# Patient Record
Sex: FEMALE | URBAN - METROPOLITAN AREA
[De-identification: names, ages, dates, MRNs, and addresses within clinical notes are randomized per-mention and may not be internally consistent; named-entity substitution may affect disease eponyms.]

---

## 2017-01-03 ENCOUNTER — APPOINTMENT (OUTPATIENT)
Age: 62
Setting detail: DERMATOLOGY
End: 2017-01-03

## 2017-01-03 VITALS — HEIGHT: 66 IN | WEIGHT: 270 LBS

## 2017-01-03 DIAGNOSIS — L71.8 OTHER ROSACEA: ICD-10-CM

## 2017-01-03 DIAGNOSIS — L82.1 OTHER SEBORRHEIC KERATOSIS: ICD-10-CM

## 2017-01-03 DIAGNOSIS — L21.8 OTHER SEBORRHEIC DERMATITIS: ICD-10-CM

## 2017-01-03 PROBLEM — D23.72 OTHER BENIGN NEOPLASM OF SKIN OF LEFT LOWER LIMB, INCLUDING HIP: Status: ACTIVE | Noted: 2017-01-03

## 2017-01-03 PROBLEM — I10 ESSENTIAL (PRIMARY) HYPERTENSION: Status: ACTIVE | Noted: 2017-01-03

## 2017-01-03 PROBLEM — M12.9 ARTHROPATHY, UNSPECIFIED: Status: ACTIVE | Noted: 2017-01-03

## 2017-01-03 PROCEDURE — OTHER FOLLOW UP FOR NEXT VISIT: OTHER

## 2017-01-03 PROCEDURE — OTHER REASSURANCE: OTHER

## 2017-01-03 PROCEDURE — 99203 OFFICE O/P NEW LOW 30 MIN: CPT

## 2017-01-03 PROCEDURE — OTHER PRESCRIPTION: OTHER

## 2017-01-03 PROCEDURE — OTHER TREATMENT REGIMEN: OTHER

## 2017-01-03 RX ORDER — METRONIDAZOLE 7.5 MG/G
THIN COAT CREAM TOPICAL BID
Qty: 1 | Refills: 2 | Status: ERX | COMMUNITY
Start: 2017-01-03

## 2017-01-03 ASSESSMENT — SEVERITY ASSESSMENT OVERALL AMONG ALL PATIENTS
IN YOUR EXPERIENCE, AMONG ALL PATIENTS YOU HAVE SEEN WITH THIS CONDITION, HOW SEVERE IS THIS PATIENT'S CONDITION?: MILD TO MODERATE

## 2017-01-03 ASSESSMENT — LOCATION SIMPLE DESCRIPTION DERM
LOCATION SIMPLE: LEFT CALF
LOCATION SIMPLE: INFERIOR FOREHEAD
LOCATION SIMPLE: RIGHT CHEEK
LOCATION SIMPLE: LEFT CHEEK
LOCATION SIMPLE: RIGHT FOREARM
LOCATION SIMPLE: RIGHT CALF

## 2017-01-03 ASSESSMENT — LOCATION ZONE DERM
LOCATION ZONE: ARM
LOCATION ZONE: FACE
LOCATION ZONE: LEG

## 2017-01-03 ASSESSMENT — LOCATION DETAILED DESCRIPTION DERM
LOCATION DETAILED: RIGHT DISTAL DORSAL FOREARM
LOCATION DETAILED: INFERIOR MID FOREHEAD
LOCATION DETAILED: LEFT INFERIOR CENTRAL MALAR CHEEK
LOCATION DETAILED: LEFT INFERIOR MEDIAL MALAR CHEEK
LOCATION DETAILED: RIGHT INFERIOR MEDIAL MALAR CHEEK
LOCATION DETAILED: LEFT DISTAL CALF
LOCATION DETAILED: RIGHT PROXIMAL CALF

## 2017-01-03 ASSESSMENT — SEVERITY ASSESSMENT: HOW SEVERE IS THIS PATIENT'S CONDITION?: MILD

## 2017-01-03 NOTE — PROCEDURE: REASSURANCE
Additional Note: I should reassure these are benign, no treatment needed. Regarding the lesion on the right arm if any irritation the summer she can follow up for a re-evaluation to see if anything more needs to be done but I see no evidence of any skin cancer or other suspicious lesion at this time
Detail Level: Detailed
Include Location In Plan?: Yes
Additional Notes (Optional): Reassured that this is a benign lesion, no treatment needed as there is no clinical suspicion for malignancy. She will follow up with any changes in color, any pain, or bleeding

## 2017-01-03 NOTE — PROCEDURE: TREATMENT REGIMEN
Plan: Long discussion with patient regarding the nature of this disease, will try metronidazole first that can change medication if needed
Detail Level: Simple
Plan: Topical metronidazole should help as well however, we can try different treatment options if needed. Of note patient is allergic to sulfa products
Otc Regimen: Dove soap, Aveeno or Crisco as lotion
Otc Regimen: Cerave or dove
Detail Level: Zone

## 2018-08-09 ENCOUNTER — APPOINTMENT (OUTPATIENT)
Age: 63
Setting detail: DERMATOLOGY
End: 2018-08-16

## 2018-08-09 VITALS — HEIGHT: 66 IN

## 2018-08-09 DIAGNOSIS — L82.1 OTHER SEBORRHEIC KERATOSIS: ICD-10-CM

## 2018-08-09 DIAGNOSIS — L30.9 DERMATITIS, UNSPECIFIED: ICD-10-CM

## 2018-08-09 DIAGNOSIS — L0391 CELLULITIS AND ABSCESS OF UNSPECIFIED SITES: ICD-10-CM

## 2018-08-09 DIAGNOSIS — L0390 CELLULITIS AND ABSCESS OF UNSPECIFIED SITES: ICD-10-CM

## 2018-08-09 PROBLEM — L08.9 LOCAL INFECTION OF THE SKIN AND SUBCUTANEOUS TISSUE, UNSPECIFIED: Status: ACTIVE | Noted: 2018-08-09

## 2018-08-09 PROCEDURE — OTHER PRESCRIPTION: OTHER

## 2018-08-09 PROCEDURE — OTHER COUNSELING: OTHER

## 2018-08-09 PROCEDURE — OTHER TREATMENT REGIMEN: OTHER

## 2018-08-09 PROCEDURE — 99214 OFFICE O/P EST MOD 30 MIN: CPT

## 2018-08-09 PROCEDURE — OTHER MIPS QUALITY: OTHER

## 2018-08-09 PROCEDURE — OTHER FOLLOW UP FOR NEXT VISIT: OTHER

## 2018-08-09 PROCEDURE — OTHER REASSURANCE: OTHER

## 2018-08-09 RX ORDER — TRIAMCINOLONE ACETONIDE 1 MG/G
THIN COAT CREAM TOPICAL BID
Qty: 1 | Refills: 1 | Status: ERX | COMMUNITY
Start: 2018-08-09

## 2018-08-09 RX ORDER — CEPHALEXIN 500 MG/1
500 MG TABLET ORAL BID
Qty: 60 | Refills: 0 | Status: ERX | COMMUNITY
Start: 2018-08-09

## 2018-08-09 ASSESSMENT — LOCATION DETAILED DESCRIPTION DERM
LOCATION DETAILED: LEFT MEDIAL MALAR CHEEK
LOCATION DETAILED: LEFT PROXIMAL DORSAL FOREARM
LOCATION DETAILED: RIGHT PROXIMAL PRETIBIAL REGION
LOCATION DETAILED: LEFT PROXIMAL PRETIBIAL REGION
LOCATION DETAILED: LEFT PROXIMAL RADIAL DORSAL FOREARM

## 2018-08-09 ASSESSMENT — LOCATION ZONE DERM
LOCATION ZONE: FACE
LOCATION ZONE: ARM
LOCATION ZONE: LEG

## 2018-08-09 ASSESSMENT — LOCATION SIMPLE DESCRIPTION DERM
LOCATION SIMPLE: LEFT PRETIBIAL REGION
LOCATION SIMPLE: LEFT CHEEK
LOCATION SIMPLE: LEFT FOREARM
LOCATION SIMPLE: RIGHT PRETIBIAL REGION

## 2018-08-09 ASSESSMENT — PAIN INTENSITY VAS
HOW INTENSE IS YOUR PAIN 0 BEING NO PAIN, 10 BEING THE MOST SEVERE PAIN POSSIBLE?: 1/10 PAIN
HOW INTENSE IS YOUR PAIN 0 BEING NO PAIN, 10 BEING THE MOST SEVERE PAIN POSSIBLE?: NO PAIN

## 2018-08-09 ASSESSMENT — SEVERITY ASSESSMENT
SEVERITY: MILD
SEVERITY: MILD TO MODERATE

## 2018-08-09 ASSESSMENT — BSA RASH: BSA RASH: 5

## 2018-08-09 NOTE — HPI: BUMPS
How Severe Are Your Bumps?: moderate
Have Your Bumps Been Treated?: not been treated
Is This A New Presentation, Or A Follow-Up?: Bump
Additional History: Pt has taken cephalexin and omeprazole, did not help.  The patient developed swelling and tenderness of the left arm several weeks ago. She has seen her primary care physician who did an ultrasound which showed no evidence of any tumor and was being treated for what sounds like a cellulitis. She was put on an antibiotic for seven days that did seem to help but when she came off the antibiotic within several days the arm started swelling and becoming painful again. There are no open sores, she has no fever or chills, no other skin surfaces are affected

## 2018-08-09 NOTE — PROCEDURE: TREATMENT REGIMEN
Detail Level: Simple
Plan: Patient is complaining of some slight itching on the left arm where she has what I believe to be a low level infection. I am going to send in a topical steroid to be used as needed. She can also use this on her lower legs from time to time if she has any itching
Plan: This does appear consistent with cellulitis or a sub cutaneous low level infection. There is induration and heat on palpation but no overt redness or fluctuance. I am going to put her back on the oral antibiotics for 14 to 21 days. Patient will need to call in one week to check in regarding progress. If no improvement or any worsening she may need further evaluation

## 2018-08-09 NOTE — PROCEDURE: REASSURANCE
Detail Level: Zone
Additional Note: Patient reassured these are benign, no treatment needed. Follow up with these increase in size or become painful
Include Location In Plan?: Yes

## 2018-08-21 ENCOUNTER — APPOINTMENT (OUTPATIENT)
Age: 63
Setting detail: DERMATOLOGY
End: 2018-08-21

## 2018-08-21 VITALS — HEIGHT: 69 IN

## 2018-08-21 DIAGNOSIS — L21.8 OTHER SEBORRHEIC DERMATITIS: ICD-10-CM

## 2018-08-21 DIAGNOSIS — D485 NEOPLASM OF UNCERTAIN BEHAVIOR OF SKIN: ICD-10-CM

## 2018-08-21 DIAGNOSIS — L0390 CELLULITIS AND ABSCESS OF UNSPECIFIED SITES: ICD-10-CM

## 2018-08-21 DIAGNOSIS — L71.8 OTHER ROSACEA: ICD-10-CM

## 2018-08-21 DIAGNOSIS — L0391 CELLULITIS AND ABSCESS OF UNSPECIFIED SITES: ICD-10-CM

## 2018-08-21 PROBLEM — J45.909 UNSPECIFIED ASTHMA, UNCOMPLICATED: Status: ACTIVE | Noted: 2018-08-21

## 2018-08-21 PROBLEM — D48.5 NEOPLASM OF UNCERTAIN BEHAVIOR OF SKIN: Status: ACTIVE | Noted: 2018-08-21

## 2018-08-21 PROBLEM — L08.9 LOCAL INFECTION OF THE SKIN AND SUBCUTANEOUS TISSUE, UNSPECIFIED: Status: ACTIVE | Noted: 2018-08-21

## 2018-08-21 PROBLEM — L85.3 XEROSIS CUTIS: Status: ACTIVE | Noted: 2018-08-21

## 2018-08-21 PROCEDURE — OTHER COUNSELING: OTHER

## 2018-08-21 PROCEDURE — 99214 OFFICE O/P EST MOD 30 MIN: CPT | Mod: 25

## 2018-08-21 PROCEDURE — 11302 SHAVE SKIN LESION 1.1-2.0 CM: CPT

## 2018-08-21 PROCEDURE — OTHER PRESCRIPTION: OTHER

## 2018-08-21 PROCEDURE — OTHER SHAVE REMOVAL: OTHER

## 2018-08-21 PROCEDURE — OTHER FOLLOW UP FOR NEXT VISIT: OTHER

## 2018-08-21 PROCEDURE — OTHER TREATMENT REGIMEN: OTHER

## 2018-08-21 RX ORDER — CEPHALEXIN 500 MG/1
500 MG TABLET ORAL BID
Qty: 28 | Refills: 0 | Status: ERX

## 2018-08-21 ASSESSMENT — LOCATION SIMPLE DESCRIPTION DERM
LOCATION SIMPLE: LEFT CHEEK
LOCATION SIMPLE: LEFT FOREARM
LOCATION SIMPLE: INFERIOR FOREHEAD
LOCATION SIMPLE: NOSE
LOCATION SIMPLE: RIGHT CHEEK
LOCATION SIMPLE: LEFT CALF

## 2018-08-21 ASSESSMENT — SEVERITY ASSESSMENT
SEVERITY: CLEAR
HOW SEVERE IS THIS PATIENT'S CONDITION?: MODERATE

## 2018-08-21 ASSESSMENT — LOCATION DETAILED DESCRIPTION DERM
LOCATION DETAILED: LEFT DISTAL CALF
LOCATION DETAILED: RIGHT MEDIAL MALAR CHEEK
LOCATION DETAILED: LEFT INFERIOR CENTRAL MALAR CHEEK
LOCATION DETAILED: LEFT PROXIMAL RADIAL DORSAL FOREARM
LOCATION DETAILED: INFERIOR MID FOREHEAD
LOCATION DETAILED: RIGHT INFERIOR MEDIAL MALAR CHEEK
LOCATION DETAILED: NASAL DORSUM
LOCATION DETAILED: LEFT CENTRAL MALAR CHEEK

## 2018-08-21 ASSESSMENT — LOCATION ZONE DERM
LOCATION ZONE: NOSE
LOCATION ZONE: FACE
LOCATION ZONE: ARM
LOCATION ZONE: LEG

## 2018-08-21 NOTE — PROCEDURE: SHAVE REMOVAL
Billing Type: Third-Party Bill
Medical Necessity Clause: This procedure was medically necessary because the lesion that was treated was: changing color, suspicious for malignancy
Size Of Lesion In Cm (Required): 1.3
Was A Bandage Applied: Yes
Hemostasis: Electrocautery
Medical Necessity Information: It is in your best interest to select a reason for this procedure from the list below. All of these items fulfill various CMS LCD requirements except the new and changing color options.
Detail Level: Detailed
Size Of Margin In Cm (Margins Are Not Added To Billing Dimensions): 0
Bill 61995 For Specimen Handling/Conveyance To Laboratory?: no
Anesthesia Volume In Cc: 1
Notification Instructions: Patient will be notified of biopsy results. However, patient instructed to call the office if not contacted within 2 weeks.
Consent was obtained from the patient. The risks and benefits to therapy were discussed in detail. Specifically, the risks of infection, scarring, bleeding, prolonged wound healing, incomplete removal, allergy to anesthesia, nerve injury and recurrence were addressed. Prior to the procedure, the treatment site was clearly identified and confirmed by the patient. All components of Universal Protocol/PAUSE Rule completed.
Biopsy Method: Dermablade
Post-Care Instructions: I reviewed with the patient in detail post-care instructions. Patient is to keep the biopsy site dry overnight, and then apply bacitracin twice daily until healed. Patient may apply hydrogen peroxide soaks to remove any crusting.
Path Notes (To The Dermatopathologist): atypical nevus vs dermatofibroma, please evaluate
Anesthesia Type: 1% Xylocaine with epinephrine
Wound Care: Polysporin ointment

## 2018-08-21 NOTE — PROCEDURE: TREATMENT REGIMEN
Plan: Patient likely has a component of seborrheic dermatitis as well, she will use the topical steroid samples as directed once daily at night for 3 to 5 days and then 3 to 5 days off for the next 30 days. Hopefully as the Soolantra starts to work for the rosacea it will also help the seborrheic dermatitis. If not we will need to consider a different topical medication specifically for the seborrheic dermatitis. Again as the patient starts using moisturizer once or twice daily that should also help
Samples Given: Desonate gel
Plan: This is likely a benign lesion however the lesion had gotten bigger and was getting irritated so we were able to remove it. Follow up will be based on the results. Care instructions given to patient
Detail Level: Simple
Plan: Patient tried and failed metronidazole approximately one year ago. Patient states that the medication burned and so she did not continue using it. She will try the samples of Soolantra and call me if this medication is working so that we can send in a prescription. She will also start using a daily moisturizer on the face like Aveeno or Cerave.  She will continue using gentle cleanser’s. I did tell the patient that this topical medication could take up to six weeks to see improvement
Detail Level: Detailed
Plan: As of today it disappears completely resolved, the skin feels and looks normal. There is no redness, no pain, no induration. I’m not sure why the pharmacy only gave her a two week supply of the medication so we will send in a refill of that medication to continue for another two weeks to ensure total resolution. Patient will call or follow up if there is any recurrence. We are still trying to get the ultrasound results to find out what that showed but at this point it’s probably not that vital

## 2018-08-21 NOTE — PROCEDURE: FOLLOW UP FOR NEXT VISIT
Scheduled For Follow Up In (Optional): 6 weeks
Detail Level: Simple
Instructions (Optional): If any recurrence
Scheduled For Follow Up In (Optional): based on path
Scheduled For Follow Up In (Optional): prn
Scheduled For Follow Up In (Optional): 2 months